# Patient Record
Sex: FEMALE | Race: BLACK OR AFRICAN AMERICAN | Employment: FULL TIME | URBAN - METROPOLITAN AREA
[De-identification: names, ages, dates, MRNs, and addresses within clinical notes are randomized per-mention and may not be internally consistent; named-entity substitution may affect disease eponyms.]

---

## 2021-04-13 ENCOUNTER — OFFICE VISIT (OUTPATIENT)
Dept: OBGYN CLINIC | Age: 51
End: 2021-04-13
Payer: COMMERCIAL

## 2021-04-13 VITALS
RESPIRATION RATE: 16 BRPM | WEIGHT: 293 LBS | SYSTOLIC BLOOD PRESSURE: 148 MMHG | BODY MASS INDEX: 45.99 KG/M2 | HEART RATE: 75 BPM | DIASTOLIC BLOOD PRESSURE: 89 MMHG | HEIGHT: 67 IN | TEMPERATURE: 98 F

## 2021-04-13 DIAGNOSIS — Z11.3 SCREENING FOR VENEREAL DISEASE: ICD-10-CM

## 2021-04-13 DIAGNOSIS — D21.9 FIBROIDS: Primary | ICD-10-CM

## 2021-04-13 DIAGNOSIS — N92.1 MENORRHAGIA WITH IRREGULAR CYCLE: ICD-10-CM

## 2021-04-13 DIAGNOSIS — Z12.4 SCREENING FOR MALIGNANT NEOPLASM OF CERVIX: ICD-10-CM

## 2021-04-13 PROCEDURE — 99203 OFFICE O/P NEW LOW 30 MIN: CPT | Performed by: OBSTETRICS & GYNECOLOGY

## 2021-04-13 RX ORDER — LINACLOTIDE 290 UG/1
CAPSULE, GELATIN COATED ORAL
COMMUNITY
Start: 2021-02-23

## 2021-04-13 RX ORDER — TRIAMCINOLONE ACETONIDE 55 UG/1
SPRAY, METERED NASAL
COMMUNITY
Start: 2021-04-12

## 2021-04-13 RX ORDER — PROPRANOLOL HYDROCHLORIDE 10 MG/1
TABLET ORAL
COMMUNITY
Start: 2021-04-12

## 2021-04-13 RX ORDER — INSULIN GLARGINE 100 [IU]/ML
INJECTION, SOLUTION SUBCUTANEOUS
COMMUNITY
Start: 2021-02-23

## 2021-04-13 RX ORDER — PROGESTERONE 200 MG/1
CAPSULE ORAL
COMMUNITY
Start: 2021-04-12

## 2021-04-13 RX ORDER — FLUCONAZOLE 150 MG/1
TABLET ORAL
COMMUNITY
Start: 2021-03-02 | End: 2021-05-03 | Stop reason: ALTCHOICE

## 2021-04-13 RX ORDER — METFORMIN HYDROCHLORIDE 1000 MG/1
TABLET ORAL
COMMUNITY
Start: 2021-04-12

## 2021-04-13 RX ORDER — DOCUSATE SODIUM 100 MG/1
CAPSULE, LIQUID FILLED ORAL
COMMUNITY
Start: 2021-03-16

## 2021-04-13 RX ORDER — TOPIRAMATE 25 MG/1
TABLET ORAL
COMMUNITY
Start: 2021-04-12

## 2021-04-13 RX ORDER — IRON,CARBONYL/ASCORBIC ACID 65MG-125MG
TABLET, DELAYED RELEASE (ENTERIC COATED) ORAL
COMMUNITY
Start: 2021-03-16

## 2021-04-13 RX ORDER — PEN NEEDLE, DIABETIC 31 GX5/16"
NEEDLE, DISPOSABLE MISCELLANEOUS
COMMUNITY
Start: 2021-02-23

## 2021-04-13 RX ORDER — SIMVASTATIN 40 MG/1
TABLET, FILM COATED ORAL
COMMUNITY
Start: 2021-04-12

## 2021-04-13 RX ORDER — ERGOCALCIFEROL 1.25 MG/1
CAPSULE ORAL
COMMUNITY
Start: 2021-02-23

## 2021-04-13 NOTE — PROGRESS NOTES
21-year-old black female  5 para 5-0-0-5, last menstrual period 2021, history of 5 prior spontaneous vaginal deliveries at full-term without complication, dysplasia treated with cryotherapy , 1 year history of oral contraceptive use, 1 year history of Depo-Provera use, medical history notable for diabetes, hypertension, and anemia, history of bilateral tubal ligation , laparoscopic cholecystectomy , now with a history of uterine fibroids dating back to , and recent worsening menorrhagia over the last 2 years, who presents for evaluation. Patient states that she was diagnosed with fibroids back in  and remarks that her periods have gradually been getting heavier, as well as more painful. Over the last 2 years she states she has had to wear a diaper and her bleeding has progressed to the point where she no longer can leave her home during her menstrual cycle. Generally they are lasting somewhere between 3 and 7 days although she once had a period that lasted 6 weeks. She denies dizziness, lightheadedness, and syncope, but reports chronic fatigue when she is bleeding. She also complains of some dysmenorrhea which occasionally reaches a 10/10. She was originally placed on progesterone 2 years ago, which worked well for a while, but her bleeding is progressed to the point where it no longer works all that well. She is sexually active with the same partner since 2008. Last sexual encounter was 2 days ago and she denies pain or bleeding with intercourse. Last Pap smear1 year agowithin normal limits by history  Last mammogram 2021within normal limits by history  Last colonoscopynever    Medical history is notable for diabetes, hypertension, and anemia. Her last hemoglobin A1c was 5.4. 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   She currently vapes; former smoker      Past Medical History:   Diagnosis Date    Anemia     Arthritis     Depression     Diabetes ([address]      Past Surgical History:   Procedure Laterality Date    IR CHOLECYSTOSTOMY PERCUTANEOUS      [place] LAP,TUBAL CAUTERY  1997      Social History     Socioeconomic History    Marital status: UNKNOWN     Spouse name: Not on file    Number of children: Not on file    Years of education: Not on file    Highest education level: Not on file   Tobacco Use    Smoking status: Former Smoker    Smokeless tobacco: Never Used   Substance and Sexual Activity    Alcohol use: Yes     Frequency: 2-3 times a week     Drinks per session: 1 or 2     Binge frequency: Never    Drug use: Never      Family History   Problem Relation Age of Onset    Diabetes Mother     Diabetes Father       Current Outpatient Medications on File Prior to Visit   Medication Sig Dispense Refill     mg capsule TAKE ONE CAPSULE BY MOUTH ONCE DAILY AS NEEDED      Vitamin D2 1,250 mcg (50,000 unit) capsule TAKE 1 CAPSULE BY MOUTH EVERY 2 WEEKS      fluconazole (DIFLUCAN) 150 mg tablet TAKE ONE TABLET BY MOUTH ONCE today AND REPEAT in THREE DAYS      Lantus Solostar U-100 Insulin 100 unit/mL (3 mL) inpn INJECT 20 UNITS SUBCUTANEOUSLY AT BEDTIME      Vitron-C 65 mg iron- 125 mg TbEC TAKE ONE TABLET BY MOUTH ONCE DAILY      Linzess 290 mcg cap capsule TAKE ONE CAPSULE BY MOUTH 30 MINUTES BEFORE FIRST meal of THE DAY      metFORMIN (GLUCOPHAGE) 1,000 mg tablet       BD Ultra-Fine Short Pen Needle 31 gauge x 5/16\" ndle USE AS DIRECTED once EVERY DAY      progesterone (PROMETRIUM) 200 mg capsule       propranoloL (INDERAL) 10 mg tablet       simvastatin (ZOCOR) 40 mg tablet       topiramate (TOPAMAX) 25 mg tablet       triamcinolone (NASACORT AQ) 55 mcg nasal inhaler        No current facility-administered medications on file prior to visit.       Allergies as of 04/13/2021    (No Known Allergies)       Constitutional: Chaperone: accepted and present. General Appearance: healthy-appearing, well-nourished, and well-developed; overweight black female. Level of Distress: NAD. Ambulation: ambulating normally. Psychiatric:   Insight: good judgement. Mental Status: normal mood and affect and active and alert. Orientation: to time, place, and person. Memory: recent memory normal and remote memory normal.     Head: Head: normocephalic and atraumatic. Neck:   Neck: supple, FROM, trachea midline, and no masses. Lymph Nodes: no cervical LAD, supraclavicular LAD, axillary LAD, or inguinal LAD. Thyroid: no enlargement or nodules and non-tender. Lungs:   Respiratory effort: no dyspnea. Auscultation: no wheezing, rales/crackles, or rhonchi and breath sounds normal, good air movement, and CTA except as noted. Cardiovascular:   Heart Auscultation: normal S1 and S2; no murmurs, rubs, or gallops; and RRR. Pulses including femoral / pedal: normal throughout. Breast: Breast: no masses or abnormal secretions and normal appearance. Abdomen: Bowel Sounds: normal. Inspection and Palpation: no tenderness, guarding, masses, rebound tenderness, or CVA tenderness and non-distended. Liver: non-tender and no hepatomegaly. Spleen: non-tender and no splenomegaly. Hernia: none palpable. Incisions well-healed laparoscopic incisions in the right upper quadrant    Female :   External genitalia: no lesions or rash and normal.   Vagina: moist mucosa; scant malodorous discharge. Cervix: no discharge, inflammation, or cervical motion tenderness   Uterus: midline, smooth, and non-tender; 10-12-week size   Adnexae: no adnexal mass or tenderness and size WNL. Bladder and Urethra: normal bladder and urethra (except where noted). Musculoskeletal[de-identified]   Motor Strength and Tone: normal tone and motor strength.    Joints, Bones, and Muscles: no contractures, malalignment, tenderness, or bony abnormalities and normal movement of all extremities. Extremities: no cyanosis, edema, varicosities, or palpable cord. Neurologic:   Gait and Station: normal gait and station. Sensation: grossly intact. Reflexes: DTRs 2+ bilaterally throughout. Skin:   Inspection and palpation: no rash, lesions, ulcer, induration, nodules, jaundice, or abnormal nevi and good turgor. Nails: normal.     Back: Thoracolumbar Appearance: normal curvature. ICD-10-CM ICD-9-CM    1. Fibroids  D21.9 215.9    2. Screening for malignant neoplasm of cervix  Z12.4 V76.2 PAP IG, CT-NG-TV, APTIMA HPV AND RFX 86/20,18([id number])   3. Menorrhagia with irregular cycle  N92.1 626.2    4. Screening for venereal disease  Z11.3 V74.5      Significant menorrhagia with fibroids  Patient was counseled regarding her alternatives including expectant management, uterine artery embolization, endometrial ablation, myomectomies, hysterectomy. Risks and benefits of each discussed. Specific risks of surgery discussed included bleeding, infections, transfusions, visceral injuries to bowel, bladder, vascular structures, ureters. Patient was given an opportunity to ask questions, all questions were answered, patient voiced understanding of above. We will have patient follow-up in 2 to 3 weeks for results of Pap smear, and consideration for additional therapies.

## 2021-04-16 LAB
C TRACH RRNA CVX QL NAA+PROBE: NEGATIVE
CYTOLOGIST CVX/VAG CYTO: NORMAL
CYTOLOGY CVX/VAG DOC CYTO: NORMAL
CYTOLOGY CVX/VAG DOC THIN PREP: NORMAL
DX ICD CODE: NORMAL
HPV I/H RISK 4 DNA CVX QL PROBE+SIG AMP: NEGATIVE
Lab: NORMAL
N GONORRHOEA RRNA CVX QL NAA+PROBE: NEGATIVE
OTHER STN SPEC: NORMAL
STAT OF ADQ CVX/VAG CYTO-IMP: NORMAL
T VAGINALIS RRNA SPEC QL NAA+PROBE: NEGATIVE

## 2021-05-03 ENCOUNTER — OFFICE VISIT (OUTPATIENT)
Dept: OBGYN CLINIC | Age: 51
End: 2021-05-03
Payer: COMMERCIAL

## 2021-05-03 VITALS
OXYGEN SATURATION: 100 % | WEIGHT: 293 LBS | RESPIRATION RATE: 16 BRPM | DIASTOLIC BLOOD PRESSURE: 99 MMHG | SYSTOLIC BLOOD PRESSURE: 161 MMHG | TEMPERATURE: 97.1 F | HEIGHT: 67 IN | HEART RATE: 63 BPM | BODY MASS INDEX: 45.99 KG/M2

## 2021-05-03 DIAGNOSIS — I10 ESSENTIAL HYPERTENSION: ICD-10-CM

## 2021-05-03 DIAGNOSIS — E11.8 CONTROLLED TYPE 2 DIABETES MELLITUS WITH COMPLICATION, WITHOUT LONG-TERM CURRENT USE OF INSULIN (HCC): ICD-10-CM

## 2021-05-03 DIAGNOSIS — D21.9 FIBROIDS: Primary | ICD-10-CM

## 2021-05-03 DIAGNOSIS — N92.1 MENORRHAGIA WITH IRREGULAR CYCLE: ICD-10-CM

## 2021-05-03 PROCEDURE — 99214 OFFICE O/P EST MOD 30 MIN: CPT | Performed by: OBSTETRICS & GYNECOLOGY

## 2021-05-03 NOTE — PROGRESS NOTES
21-year-old black female  5 para 5-0-0-5, last menstrual period 2021, history of 5 prior spontaneous vaginal deliveries at full-term without complication, dysplasia treated with cryotherapy , 1 year history of oral contraceptive use, 1 year history of Depo-Provera use, medical history notable for diabetes, hypertension, and anemia, history of bilateral tubal ligation , laparoscopic cholecystectomy , now with a history of uterine fibroids dating back to , and recent worsening menorrhagia over the last 2 years, who presents for results and follow-up. Patient states that she has not had a bleeding episode since her last visit. At her previous visit, the patient stated that  she was diagnosed with fibroids back in  and that her periods have gradually been getting heavier, as well as more painful. Over the last 2 years she states she has had to wear a diaper and her bleeding has progressed to the point where she no longer can leave her home during her menstrual cycle. Generally they are lasting somewhere between 3 and 7 days although she once had a period that lasted 6 weeks. She denies dizziness, lightheadedness, and syncope, but reports chronic fatigue when she is bleeding. She also complains of some dysmenorrhea which occasionally reaches a 10/10. She was originally placed on progesterone 2 years ago, which worked well for a while, but her bleeding is progressed to the point where it no longer works all that well. She is sexually active with the same partner since 2008. He is 79years old, and the last encounter was yesterday. she denies pain or bleeding with intercourse. Last Pap smear2021 negative  Last mammogram 2021within normal limits by history  Last colonoscopynever    Medical history is notable for diabetes, hypertension, and anemia. Her last hemoglobin A1c was 5.4.   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 She currently vapes; former smoker      Past Medical History:   Diagnosis Date    Anemia     Arthritis     Depression     Diabetes ([address]      Past Surgical History:   Procedure Laterality Date    IR CHOLECYSTOSTOMY PERCUTANEOUS      [place] LAP,TUBAL CAUTERY  1997      Social History     Socioeconomic History    Marital status: UNKNOWN     Spouse name: Not on file    Number of children: Not on file    Years of education: Not on file    Highest education level: Not on file   Tobacco Use    Smoking status: Former Smoker    Smokeless tobacco: Never Used   Substance and Sexual Activity    Alcohol use: Yes     Frequency: 2-3 times a week     Drinks per session: 1 or 2     Binge frequency: Never    Drug use: Never      Family History   Problem Relation Age of Onset    Diabetes Mother     Diabetes Father       Current Outpatient Medications on File Prior to Visit   Medication Sig Dispense Refill     mg capsule TAKE ONE CAPSULE BY MOUTH ONCE DAILY AS NEEDED      Vitamin D2 1,250 mcg (50,000 unit) capsule TAKE 1 CAPSULE BY MOUTH EVERY 2 WEEKS      Lantus Solostar U-100 Insulin 100 unit/mL (3 mL) inpn INJECT 20 UNITS SUBCUTANEOUSLY AT BEDTIME      Vitron-C 65 mg iron- 125 mg TbEC TAKE ONE TABLET BY MOUTH ONCE DAILY      Linzess 290 mcg cap capsule TAKE ONE CAPSULE BY MOUTH 30 MINUTES BEFORE FIRST meal of THE DAY      metFORMIN (GLUCOPHAGE) 1,000 mg tablet       BD Ultra-Fine Short Pen Needle 31 gauge x 5/16\" ndle USE AS DIRECTED once EVERY DAY      progesterone (PROMETRIUM) 200 mg capsule       propranoloL (INDERAL) 10 mg tablet       simvastatin (ZOCOR) 40 mg tablet       topiramate (TOPAMAX) 25 mg tablet       triamcinolone (NASACORT AQ) 55 mcg nasal inhaler        No current facility-administered medications on file prior to visit.       Allergies as of 05/03/2021    (No Known Allergies)       Constitutional: Chaperone: accepted and present. General Appearance: healthy-appearing, well-nourished, and well-developed; overweight black female. Level of Distress: NAD. Ambulation: ambulating normally. Psychiatric:   Insight: good judgement. Mental Status: normal mood and affect and active and alert. Orientation: to time, place, and person. Memory: recent memory normal and remote memory normal.     Head: Head: normocephalic and atraumatic. Neck:   Neck: supple, FROM, trachea midline, and no masses. Lymph Nodes: no cervical LAD, supraclavicular LAD, axillary LAD, or inguinal LAD. Thyroid: no enlargement or nodules and non-tender. Lungs:   Respiratory effort: no dyspnea. Auscultation: no wheezing, rales/crackles, or rhonchi and breath sounds normal, good air movement, and CTA except as noted. Cardiovascular:   Heart Auscultation: normal S1 and S2; no murmurs, rubs, or gallops; and RRR. Pulses including femoral / pedal: normal throughout. Abdomen: Bowel Sounds: normal. Inspection and Palpation: no tenderness, guarding, masses, rebound tenderness, or CVA tenderness and non-distended. Liver: non-tender and no hepatomegaly. Spleen: non-tender and no splenomegaly. Hernia: none palpable. Incisions well-healed laparoscopic incisions in the right upper quadrant    Musculoskeletal[de-identified]   Motor Strength and Tone: normal tone and motor strength. Joints, Bones, and Muscles: no contractures, malalignment, tenderness, or bony abnormalities and normal movement of all extremities. Extremities: no cyanosis, edema, varicosities, or palpable cord. Neurologic:   Gait and Station: normal gait and station. Sensation: grossly intact. Reflexes: DTRs 2+ bilaterally throughout. Skin:   Inspection and palpation: no rash, lesions, ulcer, induration, nodules, jaundice, or abnormal nevi and good turgor. Nails: normal.     Back: Thoracolumbar Appearance: normal curvature.        ICD-10-CM ICD-9-CM 1. Fibroids  D21.9 215.9 REFERRAL TO INTERVENTIONAL RADIOLOGY   2. Menorrhagia with irregular cycle  N92.1 626.2 REFERRAL TO INTERVENTIONAL RADIOLOGY   3. Essential hypertension  I10 401.9    4. Controlled type 2 diabetes mellitus with complication, without long-term current use of insulin (HCC)  E11.8 250.90      Results of Pap smear, and ultrasound reviewed with patient. Significant menorrhagia with fibroids  Patient voices a desire for minimally invasive therapy. Ultrasound report reviewed with patient, and describes multiple fibroids, therefore feel uterine artery embolization would be preferable over endometrial ablation. Hysterectomy discussed as well along with surgical risks of bleeding, transfusions, infections, visceral injuries to bowel, bladder, vascular structures, ureter. Patient was given an opportunity to ask questions, all questions were answered, patient voiced understanding of above. Will make referral to interventional radiology and have patient follow-up here 6 weeks after her procedure.

## 2021-05-03 NOTE — PROGRESS NOTES
1. Have you been to the ER, urgent care clinic since your last visit? Hospitalized since your last visit? No    2. Have you seen or consulted any other health care providers outside of the [address] [place] [address] since your last visit? Include any pap smears or colon screening.  No    Chief Complaint   Patient presents with    Follow-up     follow ups for results, discuss abalation     Visit Vitals  BP (!) 161/99 (BP 1 Location: Left lower arm, BP Patient Position: Sitting, BP Cuff Size: Adult)   Pulse 63   Temp 97.1 °F (36.2 °C) (Temporal)   Resp 16   Ht 5' 7\" (1.702 m)   Wt 307 lb (139.3 kg)   SpO2 100%   BMI 48.08 kg/m²

## 2021-08-20 ENCOUNTER — TELEPHONE (OUTPATIENT)
Dept: OBGYN CLINIC | Age: 51
End: 2021-08-20

## 2022-01-31 ENCOUNTER — HOSPITAL ENCOUNTER (EMERGENCY)
Age: 52
Discharge: HOME | End: 2022-01-31
Attending: EMERGENCY MEDICINE
Payer: COMMERCIAL

## 2022-01-31 VITALS
WEIGHT: 293 LBS | BODY MASS INDEX: 45.99 KG/M2 | OXYGEN SATURATION: 98 % | SYSTOLIC BLOOD PRESSURE: 162 MMHG | DIASTOLIC BLOOD PRESSURE: 110 MMHG | HEART RATE: 96 BPM | HEIGHT: 67 IN | TEMPERATURE: 98 F | RESPIRATION RATE: 18 BRPM

## 2022-01-31 DIAGNOSIS — J02.9 ACUTE PHARYNGITIS, UNSPECIFIED ETIOLOGY: Primary | ICD-10-CM

## 2022-01-31 LAB — DEPRECATED S PYO AG THROAT QL EIA: NEGATIVE

## 2022-01-31 PROCEDURE — 87070 CULTURE OTHR SPECIMN AEROBIC: CPT

## 2022-01-31 PROCEDURE — 87880 STREP A ASSAY W/OPTIC: CPT

## 2022-01-31 PROCEDURE — 74011250637 HC RX REV CODE- 250/637: Performed by: EMERGENCY MEDICINE

## 2022-01-31 PROCEDURE — 99284 EMERGENCY DEPT VISIT MOD MDM: CPT

## 2022-01-31 RX ORDER — IBUPROFEN 800 MG/1
800 TABLET ORAL ONCE
Status: COMPLETED | OUTPATIENT
Start: 2022-01-31 | End: 2022-01-31

## 2022-01-31 RX ORDER — IBUPROFEN 800 MG/1
800 TABLET ORAL
Qty: 20 TABLET | Refills: 0 | Status: SHIPPED | OUTPATIENT
Start: 2022-01-31 | End: 2022-02-07

## 2022-01-31 RX ORDER — CEPHALEXIN 250 MG/1
500 CAPSULE ORAL
Status: COMPLETED | OUTPATIENT
Start: 2022-01-31 | End: 2022-01-31

## 2022-01-31 RX ORDER — CEPHALEXIN 500 MG/1
500 CAPSULE ORAL 4 TIMES DAILY
Qty: 28 CAPSULE | Refills: 0 | Status: SHIPPED | OUTPATIENT
Start: 2022-01-31 | End: 2022-02-07

## 2022-01-31 RX ADMIN — IBUPROFEN 800 MG: 800 TABLET, FILM COATED ORAL at 10:13

## 2022-01-31 RX ADMIN — CEPHALEXIN 500 MG: 250 CAPSULE ORAL at 10:13

## 2022-01-31 NOTE — ED PROVIDER NOTES
EMERGENCY DEPARTMENT HISTORY AND PHYSICAL EXAM      Date: 1/31/2022  Patient Name: Chasidy Shah    History of Presenting Illness     Chief Complaint   Patient presents with    Sore Throat     sore throat since Thursday, denies n/v/d, laryngitis as well since Thursday evening        History Provided By: Patient    HPI: Chasidy Shah, 54 y.o. female with a past medical history significant diabetes, hypertension and Depression presents to the ED with cc of sore throat for 4 days with voice changes, patient tried over-the-counter medication with minimal relief    There are no other complaints, changes, or physical findings at this time. PCP: LUANA Little    No current facility-administered medications on file prior to encounter.      Current Outpatient Medications on File Prior to Encounter   Medication Sig Dispense Refill     mg capsule TAKE ONE CAPSULE BY MOUTH ONCE DAILY AS NEEDED      Vitamin D2 1,250 mcg (50,000 unit) capsule TAKE 1 CAPSULE BY MOUTH EVERY 2 WEEKS      Lantus Solostar U-100 Insulin 100 unit/mL (3 mL) inpn INJECT 20 UNITS SUBCUTANEOUSLY AT BEDTIME      Vitron-C 65 mg iron- 125 mg TbEC TAKE ONE TABLET BY MOUTH ONCE DAILY      Linzess 290 mcg cap capsule TAKE ONE CAPSULE BY MOUTH 30 MINUTES BEFORE FIRST meal of THE DAY      metFORMIN (GLUCOPHAGE) 1,000 mg tablet       BD Ultra-Fine Short Pen Needle 31 gauge x 5/16\" ndle USE AS DIRECTED once EVERY DAY      progesterone (PROMETRIUM) 200 mg capsule       propranoloL (INDERAL) 10 mg tablet       simvastatin (ZOCOR) 40 mg tablet       topiramate (TOPAMAX) 25 mg tablet       triamcinolone (NASACORT AQ) 55 mcg nasal inhaler          Past History     Past Medical History:  Past Medical History:   Diagnosis Date    Anemia     Arthritis     Depression     Diabetes ([address]     Hypertension        Past Surgical History:  Past Surgical History:   Procedure Laterality Date    IR CHOLECYSTOSTOMY PERCUTANEOUS      [place] LAP,TUBAL [place]  1997       Family History:  Family History   Problem Relation Age of Onset    Diabetes Mother     Diabetes Father        Social History:  Social History     Tobacco Use    Smoking status: Former Smoker    Smokeless tobacco: Never Used   Substance Use Topics    Alcohol use: Yes    Drug use: Never       Allergies:  No Known Allergies      Review of Systems     Review of Systems   Constitutional: Negative for chills and fever. HENT: Positive for sore throat and voice change. Negative for rhinorrhea. Eyes: Negative for pain and visual disturbance. Respiratory: Positive for cough. Negative for shortness of breath. Cardiovascular: Negative for chest pain and leg swelling. Gastrointestinal: Negative for abdominal pain and vomiting. Endocrine: Negative for polydipsia and polyuria. Genitourinary: Negative for dysuria and hematuria. Musculoskeletal: Negative for back pain and neck pain. Skin: Negative for color change and pallor. Neurological: Negative for weakness and headaches. Psychiatric/Behavioral: Negative for agitation and suicidal ideas. Physical Exam     Physical Exam  Vitals and nursing note reviewed. Constitutional:       General: She is not in acute distress. Appearance: She is not ill-appearing, toxic-appearing or diaphoretic. HENT:      Head: Normocephalic and atraumatic. Right Ear: Tympanic membrane normal.      Left Ear: Tympanic membrane normal.      Nose: Nose normal. No congestion. Mouth/Throat:      Mouth: Mucous membranes are moist.      Pharynx: Oropharynx is clear. Eyes:      Extraocular Movements: Extraocular movements intact. Conjunctiva/sclera: Conjunctivae normal.      Pupils: Pupils are equal, round, and reactive to light. Neck:      Trachea: Trachea normal.   Cardiovascular:      Rate and Rhythm: Normal rate and regular rhythm. Pulses: Normal pulses. Heart sounds: Normal heart sounds.    Pulmonary:      Effort: Pulmonary effort is normal.      Breath sounds: Normal breath sounds. Abdominal:      General: Bowel sounds are normal.      Palpations: Abdomen is soft. Tenderness: There is no abdominal tenderness. Musculoskeletal:         General: No tenderness, deformity or signs of injury. Normal range of motion. Cervical back: Normal range of motion and neck supple. No rigidity or tenderness. Lymphadenopathy:      Cervical: No cervical adenopathy. Skin:     General: Skin is warm and dry. Capillary Refill: Capillary refill takes less than 2 seconds. Findings: No rash. Neurological:      General: No focal deficit present. Mental Status: She is alert and oriented to person, place, and time. Cranial Nerves: No cranial nerve deficit. Sensory: No sensory deficit. Psychiatric:         Mood and Affect: Mood normal.         Behavior: Behavior normal.         Lab and Diagnostic Study Results     Labs -   No results found for this or any previous visit (from the past 12 hour(s)). Radiologic Studies -   @lastxrresult@  CT Results  (Last 48 hours)    None        CXR Results  (Last 48 hours)    None            Medical Decision Making   - I am the first provider for this patient. - I reviewed the vital signs, available nursing notes, past medical history, past surgical history, family history and social history. - Initial assessment performed. The patients presenting problems have been discussed, and they are in agreement with the care plan formulated and outlined with them. I have encouraged them to ask questions as they arise throughout their visit. Vital Signs-Reviewed the patient's vital signs.   Patient Vitals for the past 12 hrs:   Temp Pulse Resp BP SpO2   01/31/22 0841 98 °F (36.7 °C) 96 18 (!) 162/110 98 %       Records Reviewed: Nursing Notes    The patient presents with sore throat with a differential diagnosis of strep pharyngitis, URI, tonsillitis      ED Course: Provider Notes (Medical Decision Making): MDM       Procedures   Medical Decision Makingedical Decision Making  Performed by: Demetrius Mendoza MD  PROCEDURES:  Procedures       Disposition   Disposition: Condition stable and improved  DC- Adult Discharges: All of the diagnostic tests were reviewed and questions answered. Diagnosis, care plan and treatment options were discussed. The patient understands the instructions and will follow up as directed. The patients results have been reviewed with them. They have been counseled regarding their diagnosis. The patient verbally convey understanding and agreement of the signs, symptoms, diagnosis, treatment and prognosis and additionally agrees to follow up as recommended with their PCP in 24 - 48 hours. They also agree with the care-plan and convey that all of their questions have been answered. I have also put together some discharge instructions for them that include: 1) educational information regarding their diagnosis, 2) how to care for their diagnosis at home, as well a 3) list of reasons why they would want to return to the ED prior to their follow-up appointment, should their condition change. DISCHARGE PLAN:  1.    Current Discharge Medication List      CONTINUE these medications which have NOT CHANGED    Details    mg capsule TAKE ONE CAPSULE BY MOUTH ONCE DAILY AS NEEDED      Vitamin D2 1,250 mcg (50,000 unit) capsule TAKE 1 CAPSULE BY MOUTH EVERY 2 WEEKS      Lantus Solostar U-100 Insulin 100 unit/mL (3 mL) inpn INJECT 20 UNITS SUBCUTANEOUSLY AT BEDTIME      Vitron-C 65 mg iron- 125 mg TbEC TAKE ONE TABLET BY MOUTH ONCE DAILY      Linzess 290 mcg cap capsule TAKE ONE CAPSULE BY MOUTH 30 MINUTES BEFORE FIRST meal of THE DAY      metFORMIN (GLUCOPHAGE) 1,000 mg tablet       BD Ultra-Fine Short Pen Needle 31 gauge x 5/16\" ndle USE AS DIRECTED once EVERY DAY      progesterone (PROMETRIUM) 200 mg capsule       propranoloL (INDERAL) 10 mg tablet       simvastatin (ZOCOR) 40 mg tablet       topiramate (TOPAMAX) 25 mg tablet       triamcinolone (NASACORT AQ) 55 mcg nasal inhaler            2.   Follow-up Information     Follow up With Specialties Details Why Contact Info    Benita Gonzalez MD Family Medicine   [facility]  [address], [address]  [phone]          3. Return to ED if worse   4. Current Discharge Medication List            Diagnosis     Clinical Impression: No diagnosis found. Attestations:    Lam Humphrey MD    Please note that this dictation was completed with [employer], the computer voice recognition software. Quite often unanticipated grammatical, syntax, homophones, and other interpretive errors are inadvertently transcribed by the computer software. Please disregard these errors. Please excuse any errors that have escaped final proofreading. Thank you.

## 2022-02-04 ENCOUNTER — APPOINTMENT (OUTPATIENT)
Dept: GENERAL RADIOLOGY | Age: 52
End: 2022-02-04
Attending: EMERGENCY MEDICINE
Payer: COMMERCIAL

## 2022-02-04 ENCOUNTER — HOSPITAL ENCOUNTER (EMERGENCY)
Age: 52
Discharge: HOME | End: 2022-02-04
Attending: EMERGENCY MEDICINE
Payer: COMMERCIAL

## 2022-02-04 VITALS
HEIGHT: 67 IN | BODY MASS INDEX: 45.99 KG/M2 | DIASTOLIC BLOOD PRESSURE: 91 MMHG | OXYGEN SATURATION: 100 % | HEART RATE: 104 BPM | SYSTOLIC BLOOD PRESSURE: 156 MMHG | WEIGHT: 293 LBS | RESPIRATION RATE: 18 BRPM | TEMPERATURE: 98.4 F

## 2022-02-04 DIAGNOSIS — U07.1 COVID-19: Primary | ICD-10-CM

## 2022-02-04 PROCEDURE — 99282 EMERGENCY DEPT VISIT SF MDM: CPT

## 2022-02-04 PROCEDURE — 71045 X-RAY EXAM CHEST 1 VIEW: CPT

## 2022-02-04 NOTE — ED PROVIDER NOTES
EMERGENCY DEPARTMENT HISTORY AND PHYSICAL EXAM      Date: 2/4/2022  Patient Name: Brock Arredondo    History of Presenting Illness     Chief Complaint   Patient presents with    Shortness of Breath       History Provided By: Patient    HPI: Brock Arredondo, 54 y.o. female with a past medical history significant for obsesity, DM, HTN and recently dxed with Covid on Tuesday. C/O SOB. She denies chest pain, fevers, sore throat, headaches, nausea or vomiting  There are no other complaints changes, or physical findings at this time. PCP: LUANA Funes    No current facility-administered medications on file prior to encounter. Current Outpatient Medications on File Prior to Encounter   Medication Sig Dispense Refill    cephALEXin (Keflex) 500 mg capsule Take 1 Capsule by mouth four (4) times daily for 7 days. 28 Capsule 0    ibuprofen (MOTRIN) 800 mg tablet Take 1 Tablet by mouth every six (6) hours as needed for Pain for up to 7 days.  20 Tablet 0     mg capsule TAKE ONE CAPSULE BY MOUTH ONCE DAILY AS NEEDED      Vitamin D2 1,250 mcg (50,000 unit) capsule TAKE 1 CAPSULE BY MOUTH EVERY 2 WEEKS      Lantus Solostar U-100 Insulin 100 unit/mL (3 mL) inpn INJECT 20 UNITS SUBCUTANEOUSLY AT BEDTIME      Vitron-C 65 mg iron- 125 mg TbEC TAKE ONE TABLET BY MOUTH ONCE DAILY      Linzess 290 mcg cap capsule TAKE ONE CAPSULE BY MOUTH 30 MINUTES BEFORE FIRST meal of THE DAY      metFORMIN (GLUCOPHAGE) 1,000 mg tablet       BD Ultra-Fine Short Pen Needle 31 gauge x 5/16\" ndle USE AS DIRECTED once EVERY DAY      progesterone (PROMETRIUM) 200 mg capsule       propranoloL (INDERAL) 10 mg tablet       simvastatin (ZOCOR) 40 mg tablet       topiramate (TOPAMAX) 25 mg tablet       triamcinolone (NASACORT AQ) 55 mcg nasal inhaler          Past History     Past Medical History:  Past Medical History:   Diagnosis Date    Anemia     Arthritis     Depression     Diabetes ([address]     Hypertension Past Surgical History:  Past Surgical History:   Procedure Laterality Date    IR CHOLECYSTOSTOMY PERCUTANEOUS      AL LAP,TUBAL CAUTERY  1997       Family History:  Family History   Problem Relation Age of Onset    Diabetes Mother     Diabetes Father        Social History:  Social History     Tobacco Use    Smoking status: Former Smoker    Smokeless tobacco: Never Used   Substance Use Topics    Alcohol use: Yes    Drug use: Never       Allergies:  No Known Allergies      Review of Systems     Review of Systems   Constitutional: Negative. HENT: Negative. Eyes: Negative. Respiratory: Positive for shortness of breath. Negative for chest tightness and wheezing. Cardiovascular: Negative. Negative for chest pain and palpitations. Gastrointestinal: Negative. Endocrine: Negative. Genitourinary: Negative. Musculoskeletal: Negative. Skin: Negative. Allergic/Immunologic: Negative. Neurological: Negative. Hematological: Negative. Psychiatric/Behavioral: Negative. Physical Exam     Physical Exam  Vitals and nursing note reviewed. Constitutional:       Appearance: Normal appearance. HENT:      Head: Normocephalic. Right Ear: Tympanic membrane normal.      Left Ear: Tympanic membrane normal.      Nose: Nose normal.      Mouth/Throat:      Mouth: Mucous membranes are moist.   Eyes:      Pupils: Pupils are equal, round, and reactive to light. Cardiovascular:      Rate and Rhythm: Normal rate. Pulses: Normal pulses. Pulmonary:      Effort: Pulmonary effort is normal.      Breath sounds: No decreased breath sounds, wheezing, rhonchi or rales. Chest:      Chest wall: No mass, deformity, tenderness, crepitus or edema. There is no dullness to percussion. Abdominal:      General: Abdomen is flat. Palpations: Abdomen is soft. Musculoskeletal:         General: Normal range of motion. Cervical back: Normal range of motion and neck supple.    Skin: General: Skin is warm. Capillary Refill: Capillary refill takes less than 2 seconds. Neurological:      General: No focal deficit present. Mental Status: She is alert. Psychiatric:         Mood and Affect: Mood normal.         Lab and Diagnostic Study Results     Labs -   No results found for this or any previous visit (from the past 12 hour(s)). Radiologic Studies -   @lastxrresult@  CT Results  (Last 48 hours)    None        CXR Results  (Last 48 hours)               02/04/22 1408  XR CHEST PORT Final result    Narrative:  1 view       Mild patchy prominence of the peripheral interstitial markings, mainly   superimposition artifact. No effusion. Normal heart and mediastinum               Medical Decision Making   - I am the first provider for this patient. - I reviewed the vital signs, available nursing notes, past medical history, past surgical history, family history and social history. - Initial assessment performed. The patients presenting problems have been discussed, and they are in agreement with the care plan formulated and outlined with them. I have encouraged them to ask questions as they arise throughout their visit. Vital Signs-Reviewed the patient's vital signs. Patient Vitals for the past 12 hrs:   Temp Pulse Resp BP SpO2   02/04/22 1547 -- -- -- -- 100 %   02/04/22 1349 98.4 °F (36.9 °C) (!) 104 18 (!) 156/91 99 %       Records Reviewed: Nursing Notes    The patient presents with SOB a differential diagnosis of  abnormal EKG, ACS, arrhythmia, acute MI, pulmonary edema/CHF, angina, aortic dissection, bronchitis, chest wall pain, costochondritis, dyspnea, GERD, pericarditis, pnuemothorax and pnuemonia and COVID    ED Course:          Provider Notes (Medical Decision Making):      MDM       Procedures   Medical Decision Makingedical Decision Making  Performed by: Muna Henriquez MD  PROCEDURES:Procedures       Disposition   Disposition: Condition stable  DC- Adult Discharges: All of the diagnostic tests were reviewed and questions answered. Diagnosis, care plan and treatment options were discussed. The patient understands the instructions and will follow up as directed. The patients results have been reviewed with them. They have been counseled regarding their diagnosis. The patient verbally convey understanding and agreement of the signs, symptoms, diagnosis, treatment and prognosis and additionally agrees to follow up as recommended with their PCP in 24 - 48 hours. They also agree with the care-plan and convey that all of their questions have been answered. I have also put together some discharge instructions for them that include: 1) educational information regarding their diagnosis, 2) how to care for their diagnosis at home, as well a 3) list of reasons why they would want to return to the ED prior to their follow-up appointment, should their condition change. DISCHARGE PLAN:  1. Current Discharge Medication List      CONTINUE these medications which have NOT CHANGED    Details   cephALEXin (Keflex) 500 mg capsule Take 1 Capsule by mouth four (4) times daily for 7 days. Qty: 28 Capsule, Refills: 0      ibuprofen (MOTRIN) 800 mg tablet Take 1 Tablet by mouth every six (6) hours as needed for Pain for up to 7 days.   Qty: 20 Tablet, Refills: 0       mg capsule TAKE ONE CAPSULE BY MOUTH ONCE DAILY AS NEEDED      Vitamin D2 1,250 mcg (50,000 unit) capsule TAKE 1 CAPSULE BY MOUTH EVERY 2 WEEKS      Lantus Solostar U-100 Insulin 100 unit/mL (3 mL) inpn INJECT 20 UNITS SUBCUTANEOUSLY AT BEDTIME      Vitron-C 65 mg iron- 125 mg TbEC TAKE ONE TABLET BY MOUTH ONCE DAILY      Linzess 290 mcg cap capsule TAKE ONE CAPSULE BY MOUTH 30 MINUTES BEFORE FIRST meal of THE DAY      metFORMIN (GLUCOPHAGE) 1,000 mg tablet       BD Ultra-Fine Short Pen Needle 31 gauge x 5/16\" ndle USE AS DIRECTED once EVERY DAY      progesterone (PROMETRIUM) 200 mg capsule propranoloL (INDERAL) 10 mg tablet       simvastatin (ZOCOR) 40 mg tablet       topiramate (TOPAMAX) 25 mg tablet       triamcinolone (NASACORT AQ) 55 mcg nasal inhaler            2.   Follow-up Information    None       3. Return to ED if worse   4. Current Discharge Medication List            Diagnosis     Clinical Impression:   Attestations:    Georges Zhu MD    Please note that this dictation was completed with [employer], the computer voice recognition software. Quite often unanticipated grammatical, syntax, homophones, and other interpretive errors are inadvertently transcribed by the computer software. Please disregard these errors. Please excuse any errors that have escaped final proofreading. Thank you.

## 2022-02-04 NOTE — DISCHARGE INSTRUCTIONS
Covid quarantine per [facility] recommendation. Handwashing as recommended by the CDC and social distancing. Motrin or Tylenol for aches and pains as directed.   Increase plenty of fluids and rest.

## 2022-02-07 ENCOUNTER — PATIENT OUTREACH (OUTPATIENT)
Dept: CASE MANAGEMENT | Age: 52
End: 2022-02-07

## 2022-02-07 NOTE — PROGRESS NOTES
Patient contacted regarding COVID-19 diagnosis. Discussed COVID-19 related testing which was not done at this time. Test results were not done. Patient informed of results, if available? No, pt tested positive at an outside facility. Ambulatory Care Manager contacted the patient by telephone to perform post discharge assessment. Call within 2 business days of discharge: No  (3d) Verified name and  with patient as identifiers. Provided introduction to self, and explanation of the CTN/ACM role, and reason for call due to risk factors for infection and/or exposure to COVID-19. Symptoms reviewed with patient who verbalized the following symptoms: cough, no new symptoms and no worsening symptoms, pt reports \"I feel ok\"      Due to no new or worsening symptoms encounter was not routed to provider for escalation. Discussed follow-up appointments. If no appointment was previously scheduled, appointment scheduling offered:  Not at this time. [facility] follow up appointment(s): No future appointments. Non-[facility] follow up appointment(s): n/a    Interventions to address risk factors: n/a     Advance Care Planning:   Does patient have an Advance Directive: not on file. Educated patient about risk for severe COVID-19 due to risk factors according to CDC guidelines. ACM reviewed discharge instructions, medical action plan and red flag symptoms with the patient who verbalized understanding. Discussed COVID vaccination status: no. Education provided on COVID-19 vaccination as appropriate. Discussed exposure protocols and quarantine with CDC Guidelines. Patient was given an opportunity to verbalize any questions and concerns and agrees to contact ACM or health care provider for questions related to their healthcare. Reviewed and educated patient on any new and changed medications related to discharge diagnosis     Was patient discharged with a pulse oximeter?  no Discussed and confirmed pulse oximeter discharge instructions and when to notify provider or seek emergency care. ACM provided contact information. Plan for follow-up call in 3-5 days based on severity of symptoms and risk factors.

## 2022-02-11 ENCOUNTER — PATIENT OUTREACH (OUTPATIENT)
Dept: CASE MANAGEMENT | Age: 52
End: 2022-02-11

## 2022-02-11 NOTE — PROGRESS NOTES
Patient resolved from Transition of Care episode on 02/11/22. ACM/CTN was unsuccessful at contacting this patient today. Patient/family was provided the following resources and education related to COVID-19 during the initial call:                         Signs, symptoms and red flags related to COVID-19            CDC exposure and quarantine guidelines            [employer] exposure contact - [phone]            Contact for their local Department of Health                 Patient has not had any additional ED or hospital visits. No further outreach scheduled with this CTN/ACM. Episode of Care resolved.   Patient has this CTN/ACM contact information if future needs arise.  Bennie Jordan

## 2023-05-14 RX ORDER — IRON,CARBONYL/ASCORBIC ACID 65MG-125MG
1 TABLET, DELAYED RELEASE (ENTERIC COATED) ORAL DAILY
COMMUNITY
Start: 2021-03-16

## 2023-05-14 RX ORDER — PROPRANOLOL HYDROCHLORIDE 10 MG/1
TABLET ORAL
COMMUNITY
Start: 2021-04-12

## 2023-05-14 RX ORDER — PROGESTERONE 200 MG/1
CAPSULE ORAL
COMMUNITY
Start: 2021-04-12

## 2023-05-14 RX ORDER — SIMVASTATIN 40 MG
TABLET ORAL
COMMUNITY
Start: 2021-04-12

## 2023-05-14 RX ORDER — TOPIRAMATE 25 MG/1
TABLET ORAL
COMMUNITY
Start: 2021-04-12

## 2023-05-14 RX ORDER — ERGOCALCIFEROL 1.25 MG/1
CAPSULE ORAL
COMMUNITY
Start: 2021-02-23

## 2023-05-14 RX ORDER — LINACLOTIDE 290 UG/1
CAPSULE, GELATIN COATED ORAL
COMMUNITY
Start: 2021-02-23

## 2023-05-14 RX ORDER — DOCUSATE SODIUM 100 MG/1
CAPSULE, LIQUID FILLED ORAL
COMMUNITY
Start: 2021-03-16

## 2023-05-14 RX ORDER — INSULIN GLARGINE 100 [IU]/ML
INJECTION, SOLUTION SUBCUTANEOUS
COMMUNITY
Start: 2021-02-23

## 2023-05-14 RX ORDER — TRIAMCINOLONE ACETONIDE 55 UG/1
SPRAY, METERED NASAL
COMMUNITY
Start: 2021-04-12